# Patient Record
Sex: FEMALE | Race: WHITE
[De-identification: names, ages, dates, MRNs, and addresses within clinical notes are randomized per-mention and may not be internally consistent; named-entity substitution may affect disease eponyms.]

---

## 2018-07-10 ENCOUNTER — HOSPITAL ENCOUNTER (OUTPATIENT)
Dept: HOSPITAL 75 - RAD | Age: 38
End: 2018-07-10
Attending: OBSTETRICS & GYNECOLOGY
Payer: COMMERCIAL

## 2018-07-10 DIAGNOSIS — Z12.31: Primary | ICD-10-CM

## 2018-07-10 PROCEDURE — 77067 SCR MAMMO BI INCL CAD: CPT

## 2018-07-10 NOTE — DIAGNOSTIC IMAGING REPORT
INDICATION: 

Routine screening.



COMPARISON:      

11/14/2013.



TECHNIQUE: 

2D and 3D bilateral screening mammography was performed with CAD.



FINDINGS:

Both breasts are heterogeneously dense, limiting the sensitivity

of mammography. Scattered benign calcifications are noted. No

mass or malignant appearing microcalcifications are seen. The

axillae are unremarkable.



IMPRESSION:

No mammographic features suspicious for malignancy are

identified.



ACR BI-RADS Category 2: Benign findings.

Result letter will be mailed to the patient.

Note: At least 10% of breast cancer is not imaged by mammography.



Dictated by: 



  Dictated on workstation # [id number]

## 2019-01-30 ENCOUNTER — HOSPITAL ENCOUNTER (OUTPATIENT)
Dept: HOSPITAL 75 - RAD | Age: 39
End: 2019-01-30
Attending: FAMILY MEDICINE
Payer: COMMERCIAL

## 2019-01-30 DIAGNOSIS — K76.9: ICD-10-CM

## 2019-01-30 DIAGNOSIS — K80.20: Primary | ICD-10-CM

## 2019-01-30 LAB
ALBUMIN SERPL-MCNC: 4.2 GM/DL (ref 3.2–4.5)
ALP SERPL-CCNC: 62 U/L (ref 40–136)
ALT SERPL-CCNC: 43 U/L (ref 0–55)
BILIRUB SERPL-MCNC: 0.4 MG/DL (ref 0.1–1)
BUN/CREAT SERPL: 24
CALCIUM SERPL-MCNC: 8.7 MG/DL (ref 8.5–10.1)
CHLORIDE SERPL-SCNC: 108 MMOL/L (ref 98–107)
CO2 SERPL-SCNC: 24 MMOL/L (ref 21–32)
CREAT SERPL-MCNC: 0.83 MG/DL (ref 0.6–1.3)
ERYTHROCYTE [DISTWIDTH] IN BLOOD BY AUTOMATED COUNT: 13.1 % (ref 10–14.5)
GFR SERPLBLD BASED ON 1.73 SQ M-ARVRAT: > 60 ML/MIN
GLUCOSE SERPL-MCNC: 86 MG/DL (ref 70–105)
HCT VFR BLD CALC: 43 % (ref 35–52)
HGB BLD-MCNC: 14.2 G/DL (ref 11.5–16)
LIPASE SERPL-CCNC: 22 U/L (ref 8–78)
MCH RBC QN AUTO: 29 PG (ref 25–34)
MCHC RBC AUTO-ENTMCNC: 33 G/DL (ref 32–36)
MCV RBC AUTO: 88 FL (ref 80–99)
PLATELET # BLD: 260 10^3/UL (ref 130–400)
PMV BLD AUTO: 10.6 FL (ref 7.4–10.4)
POTASSIUM SERPL-SCNC: 4.2 MMOL/L (ref 3.6–5)
PROT SERPL-MCNC: 7.3 GM/DL (ref 6.4–8.2)
SODIUM SERPL-SCNC: 140 MMOL/L (ref 135–145)
WBC # BLD AUTO: 4.6 10^3/UL (ref 4.3–11)

## 2019-01-30 PROCEDURE — 83690 ASSAY OF LIPASE: CPT

## 2019-01-30 PROCEDURE — 85027 COMPLETE CBC AUTOMATED: CPT

## 2019-01-30 PROCEDURE — 36415 COLL VENOUS BLD VENIPUNCTURE: CPT

## 2019-01-30 PROCEDURE — 76705 ECHO EXAM OF ABDOMEN: CPT

## 2019-01-30 PROCEDURE — 80053 COMPREHEN METABOLIC PANEL: CPT

## 2019-01-30 NOTE — DIAGNOSTIC IMAGING REPORT
EXAM: RIGHT UPPER QUADRANT ULTRASOUND 



DATE: January 30, 2019.



COMPARISON: CT abdomen and pelvis August 6, 2010. 



INDICATION: 38-year-old female, right upper quadrant abdominal

pain. 



PROCEDURE: 



Two-dimensional grayscale and color doppler ultrasound

examination of the right upper quadrant is performed. 



FINDINGS: 



Liver: The liver is normal in size and contour. The previously

noted enhancing lesion in the right lobe of the liver on prior CT

abdomen is not demonstrated on this exam. The main portal vein

appears patent.



Bile ducts and gallbladder: There are multiple shadowing

gallstones. The gallbladder is not distended. There is no

pericholecystic fluid. There is no abnormal gallbladder wall

thickening. The gallbladder wall measures 0.2 cm.  There is no

intrahepatic or extrahepatic biliary ductal dilation. The common

bile duct measures 0.6 cm. 



Right kidney: Unremarkable right kidney. No hydronephrosis. The

right kidney measures 9.2 cm x 5.0 cm x 4.8 cm. 



Pancreas: Normal visualized pancreas. 



IMPRESSION: 

1. Cholelithiasis without evidence of acute cholecystitis.

2. No biliary ductal dilation.

3. Previously noted 2.0 x 2.0 cm enhancing lesion in the right

lobe of the liver on prior CT abdomen is not demonstrated on

current exam. This lesion is not specific in imaging appearance.

MRI abdomen with and without contrast, liver mass protocol, using

Eovist as the contrast agent is recommended to be performed at

this time for further assessment.

4. Additional right upper quadrant ultrasound evaluation is

unremarkable.



Dictated by: 



  Dictated on workstation # [id number]

## 2019-02-19 ENCOUNTER — HOSPITAL ENCOUNTER (OUTPATIENT)
Dept: HOSPITAL 75 - PREOP | Age: 39
Discharge: HOME | End: 2019-02-19
Attending: SURGERY
Payer: COMMERCIAL

## 2019-02-19 VITALS — WEIGHT: 199 LBS | BODY MASS INDEX: 35.26 KG/M2 | HEIGHT: 63 IN

## 2019-02-19 DIAGNOSIS — Z01.818: Primary | ICD-10-CM

## 2019-02-21 ENCOUNTER — HOSPITAL ENCOUNTER (OUTPATIENT)
Dept: HOSPITAL 75 - SDC | Age: 39
Discharge: HOME | End: 2019-02-21
Attending: SURGERY
Payer: COMMERCIAL

## 2019-02-21 VITALS — SYSTOLIC BLOOD PRESSURE: 121 MMHG | DIASTOLIC BLOOD PRESSURE: 69 MMHG

## 2019-02-21 VITALS — SYSTOLIC BLOOD PRESSURE: 108 MMHG | DIASTOLIC BLOOD PRESSURE: 64 MMHG

## 2019-02-21 VITALS — SYSTOLIC BLOOD PRESSURE: 114 MMHG | DIASTOLIC BLOOD PRESSURE: 66 MMHG

## 2019-02-21 VITALS — WEIGHT: 199 LBS | BODY MASS INDEX: 35.26 KG/M2 | HEIGHT: 63 IN

## 2019-02-21 VITALS — DIASTOLIC BLOOD PRESSURE: 66 MMHG | SYSTOLIC BLOOD PRESSURE: 114 MMHG

## 2019-02-21 VITALS — DIASTOLIC BLOOD PRESSURE: 61 MMHG | SYSTOLIC BLOOD PRESSURE: 124 MMHG

## 2019-02-21 DIAGNOSIS — K80.10: Primary | ICD-10-CM

## 2019-02-21 DIAGNOSIS — Z87.891: ICD-10-CM

## 2019-02-21 LAB
BASOPHILS # BLD AUTO: 0 10^3/UL (ref 0–0.1)
BASOPHILS NFR BLD AUTO: 0 % (ref 0–10)
EOSINOPHIL # BLD AUTO: 0.2 10^3/UL (ref 0–0.3)
EOSINOPHIL NFR BLD AUTO: 3 % (ref 0–10)
ERYTHROCYTE [DISTWIDTH] IN BLOOD BY AUTOMATED COUNT: 13.3 % (ref 10–14.5)
HCT VFR BLD CALC: 39 % (ref 35–52)
HGB BLD-MCNC: 13.1 G/DL (ref 11.5–16)
LYMPHOCYTES # BLD AUTO: 1.8 X 10^3 (ref 1–4)
LYMPHOCYTES NFR BLD AUTO: 35 % (ref 12–44)
MANUAL DIFFERENTIAL PERFORMED BLD QL: NO
MCH RBC QN AUTO: 30 PG (ref 25–34)
MCHC RBC AUTO-ENTMCNC: 34 G/DL (ref 32–36)
MCV RBC AUTO: 88 FL (ref 80–99)
MONOCYTES # BLD AUTO: 0.3 X 10^3 (ref 0–1)
MONOCYTES NFR BLD AUTO: 7 % (ref 0–12)
NEUTROPHILS # BLD AUTO: 2.8 X 10^3 (ref 1.8–7.8)
NEUTROPHILS NFR BLD AUTO: 55 % (ref 42–75)
PLATELET # BLD: 259 10^3/UL (ref 130–400)
PMV BLD AUTO: 10.8 FL (ref 7.4–10.4)
WBC # BLD AUTO: 5.1 10^3/UL (ref 4.3–11)

## 2019-02-21 PROCEDURE — 85025 COMPLETE CBC W/AUTO DIFF WBC: CPT

## 2019-02-21 PROCEDURE — 84703 CHORIONIC GONADOTROPIN ASSAY: CPT

## 2019-02-21 PROCEDURE — 87081 CULTURE SCREEN ONLY: CPT

## 2019-02-21 PROCEDURE — 36415 COLL VENOUS BLD VENIPUNCTURE: CPT

## 2019-02-21 RX ADMIN — SODIUM CHLORIDE, SODIUM LACTATE, POTASSIUM CHLORIDE, AND CALCIUM CHLORIDE PRN MLS/HR: 600; 310; 30; 20 INJECTION, SOLUTION INTRAVENOUS at 11:12

## 2019-02-21 RX ADMIN — SODIUM CHLORIDE, SODIUM LACTATE, POTASSIUM CHLORIDE, AND CALCIUM CHLORIDE PRN MLS/HR: 600; 310; 30; 20 INJECTION, SOLUTION INTRAVENOUS at 13:10

## 2019-02-21 NOTE — DISCHARGE INST-SURGICAL
D/C Lap Instructions-CARLA


New, Converted, or Re-Newed RX:  RX on Chart


Follow Up Appt in 2 weeks





Activity as tolerated


No driving for 24 hours


No driving while on pain medications





Incentive Spirometry use every 2 hours while awake





Regular Diet





Symptoms to Report: Fever over 101 degree F, Nausea/Vomiting 


Infection Signs and Symptoms to report:  Increased redness, Foul odor of wound, 

Increased drainage





Bathing instructions: May shower


Operative Area Clean/Dry;  Keep incision clean/dry





If any problems/questions: Contact your physician or go to Emergency Room











MELISSA AGUIRRE MD Feb 21, 2019 11:53

## 2019-02-21 NOTE — OPERATIVE REPORT
DATE OF SERVICE:  02/21/2019



ATTENDING PRIMARY CARE PHYSICIAN:

Dr. Woodson.



PREOPERATIVE DIAGNOSIS:

Symptomatic chronic calculous cholecystitis.



POSTOPERATIVE DIAGNOSIS:

Symptomatic chronic calculous cholecystitis.



PROCEDURE:

Laparoscopic cholecystectomy.



SURGEON:

Melissa Aguirre MD



ASSISTANT:

GLEN Jaimes.



ANESTHESIA:

General endotracheal.



ESTIMATED BLOOD LOSS:

Minimal.



FINDINGS:

Dilated gallbladder with multiple gallstones.



DISPOSITION:

The patient tolerated the procedure well.



INDICATIONS:

The patient is a 38-year-old female who has had pain in the right upper

abdominal quadrant for the past year on an intermittent basis usually after

meal.  She also reports radiation of pain towards the right shoulder and mid

back.  She also had associated symptoms including abdominal bloating, nausea and

vomiting as well as occasional episodes of diarrhea.  She underwent an

ultrasound, which did show gallstones.



DESCRIPTION OF PROCEDURE:

The patient was brought to the operating room, laid supine on the table.  After

adequate IV pain and sedative medications and general endotracheal intubation,

the abdomen was prepped and draped in standard surgical fashion.



A 0.5% Marcaine with epinephrine was then used to anesthetize the overlying skin

in the left upper abdominal quadrant.  A small transverse skin incision made

using 15 blade.  An 0 silk suture was applied to the medial aspect of the

incision for traction and a Veress needle inserted with a low opening pressure

of 0 mmHg and the abdomen was insufflated to 15 mmHg pressure.  The Veress

needle removed and a 5 mm Xcel trocar placed followed by a 5 mm 45-degree angle

laparoscope visualizing the peritoneal cavity.



A 4-quadrant abdominal exploration was performed.  A slightly dilated

gallbladder was identified.  What was visualized of the stomach, omentum, small

bowel, liver appeared normal.  Under direct visualization, we then proceed to

place a 10 mm supraumbilical port after the skin and peritoneal lining were

anesthetized using 0.5% Marcaine with epinephrine and a transverse skin incision

made using a 15 blade.  In a similar manner, a right upper abdominal quadrant 5

mm port was placed.



The patient was then placed in a reverse Trendelenburg position as well as

placed right side up, left side down.  The fundus of the gallbladder was then

retracted anteriorly and superiorly.  The hepatoduodenal ligament was then

opened using blunt dissection as well as electrocautery on the hook instrument

with visualization of good hemostasis.  The entire critical view of safety was

identified including the triangle of Calot as well as the cystic duct and artery

as the only two structures going into the gallbladder as well as the cystic

plate behind the proximal gallbladder.  A timeout was then taken and the cystic

duct and artery were clipped proximally, distally and cut with EndoShears.  The

gallbladder was then dissected off the liver bed using electrocautery and hook

instrument with visualization of good hemostasis as well as no leaking ducts of

Luschka.  The gallbladder was removed through the 10 mm port site using an

EndoCatch bag.



The 10 mm port fascia and peritoneum were then closed under direct visualization

using a Patricio-Cristela device and 0 Vicryl suture.  The abdomen was then

desufflated.  The remaining ports removed.  All skin incisions were closed using

4-0 Monocryl running subcuticular sutures.  Wounds were then cleaned and covered

with Dermabond.



The patient tolerated the procedure well.  We will start IV and oral pain

medication as well as a clear liquid diet.  Once she is tolerating clears, has

good pain control with oral pain medications, ambulating, we will discharge her

home.  She will be instructed to do no heavy lifting or exertion for the next

two weeks.





Job ID: [id number]

DocumentID: [id number]

Dictated Date:  02/21/2019 14:05:00

Transcription Date: 02/21/2019 20:54:28

Dictated By: MELISSA AGUIRRE MD

## 2019-02-21 NOTE — PROGRESS NOTE-POST OPERATIVE
Post-Operative Progess Note


Surgeon (s)/Assistant (s)


Surgeon


MELISSA AGUIRRE MD


Assistant:  james dunbar APRN





Pre-Operative Diagnosis


chronic calculous cholecystitis





Post-Operative Diagnosis





same





Procedure & Operative Findings


Date of Procedure


2/21/19


Procedure Performed/Findings


laparoscopic cholecystectomy


Anesthesia Type


GET





Estimated Blood Loss


Estimated blood loss (mL):  minimal





Specimens/Packing


Specimens Removed


gallbladder











MELISSA AGUIRRE MD Feb 21, 2019 14:00

## 2019-02-21 NOTE — XMS REPORT
Continuity of Care Document

 Created on: 2019



HECTOR LLAMAS

External Reference #: [id number]

: 1980

Sex: Female



Demographics







 Address  [address] [place]  [address]

 

 Home Phone  [phone] x

 

 Preferred Language  Unknown

 

 Marital Status  Unknown

 

 Nondenominational Affiliation  Unknown

 

 Race  Unknown

 

 Ethnic Group  Unknown





Author







 Author  [facility]

 

 Organization  [facility]

 

 Address  Unknown

 

 Phone  Unavailable



              



Allergies

      





 Active            Description            Code            Type            
Severity            Reaction            Onset            Reported/Identified   
         Relationship to Patient            Clinical Status        

 

 Yes            codeine            [id number]            Drug Allergy          
  Unknown            HIVES                         2015                  
                

 

 Yes            codeine            [id number]            Drug Allergy          
  Mild            HIVES                         2019                     
             



                    



Medications

      



There is no data.                  



Problems

      





 Date Dx Coded            Attending            Type            Code            
Diagnosis            Diagnosed By        

 

 10/07/2015            MANOLO MANN, ANTHONY BURDICK            Ot            
654.23                                  

 

 10/07/2015            MANOLO MANN, ANTHONY BURDICK            Ot            
V72.84                                  

 

 10/07/2015            MANOLO MANN, ANTHONY BURDICK            Ot            V74.8
                                  

 

 10/07/2015            MANOLO MANN, ANTHONY BURDICK            Ot            
O34.21                                  

 

 10/07/2015            MANOLO MANN, ANTHONY BURDICK            Ot            Z37.0
                                  

 

 10/07/2015            MANOLO MANN, ANTHONY BURDICK            Ot            
Z3A.39                                  

 

 2018            MANOLO MANN, ANTHONY BURDICK            Ot            
Z12.31            ENCNTR SCREEN MAMMOGRAM FOR MALIGNANT NE                     

 

 2018            ANTHONY FRENCH MD            Ot            
Z12.31            ENCNTR SCREEN MAMMOGRAM FOR MALIGNANT NE                     

 

 2019            KAREN HERNANDEZ MD            Ot            K76.9       
     LIVER DISEASE, UNSPECIFIED                     

 

 2019            KAREN HERNANDEZ MD            Ot            K80.20      
      CALCULUS OF GALLBLADDER W/O CHOLECYSTITI                     

 

 2019            KAREN HERNANDEZ MD, Ot            K76.9       
     LIVER DISEASE, UNSPECIFIED                     

 

 2019            KAREN HERNANDEZ MD, Ot            K80.20      
      CALCULUS OF GALLBLADDER W/O CHOLECYSTITI                     

 

 2019            KAREN HERNANDEZ MD, Ot            K76.9       
     LIVER DISEASE, UNSPECIFIED                     

 

 2019            KAREN HERNANDEZ MD, Ot            K80.20      
      CALCULUS OF GALLBLADDER W/O CHOLECYSTITI                     



                                            



Procedures

      



There is no data.                  



Results

      





 Test            Result            Range        









 Automated blood complete blood count (hemogram) panel - 19 08:41         









 Blood leukocytes automated count (number/volume)            4.6 10*3/uL       
     4.3-11.0        

 

 Blood erythrocytes automated count (number/volume)            4.88 10*6/uL    
        4.35-5.85        

 

 Venous blood hemoglobin measurement (mass/volume)            14.2 g/dL        
    11.5-16.0        

 

 Blood hematocrit (volume fraction)            43 %            35-52        

 

 Automated erythrocyte mean corpuscular volume            88 [foz_us]          
  80-99        

 

 Automated erythrocyte mean corpuscular hemoglobin (mass per erythrocyte)      
      29 pg            25-34        

 

 Automated erythrocyte mean corpuscular hemoglobin concentration measurement (
mass/volume)            33 g/dL            32-36        

 

 Automated erythrocyte distribution width ratio            13.1 %            
10.0-14.5        

 

 Automated blood platelet count (count/volume)            260 10*3/uL          
  130-400        

 

 Automated blood platelet mean volume measurement            10.6 [foz_us]     
       7.4-10.4        









 Comprehensive metabolic panel - 19 08:41         









 Serum or plasma sodium measurement (moles/volume)            140 mmol/L       
     135-145        

 

 Serum or plasma potassium measurement (moles/volume)            4.2 mmol/L    
        3.6-5.0        

 

 Serum or plasma chloride measurement (moles/volume)            108 mmol/L     
               

 

 Carbon dioxide            24 mmol/L            21-32        

 

 Serum or plasma anion gap determination (moles/volume)            8 mmol/L    
        5-14        

 

 Serum or plasma urea nitrogen measurement (mass/volume)            20 mg/dL   
         7-18        

 

 Serum or plasma creatinine measurement (mass/volume)            0.83 mg/dL    
        0.60-1.30        

 

 Serum or plasma urea nitrogen/creatinine mass ratio            24             
NRG        

 

 Serum or plasma creatinine measurement with calculation of estimated 
glomerular filtration rate            >             NRG        

 

 Serum or plasma glucose measurement (mass/volume)            86 mg/dL         
           

 

 Serum or plasma calcium measurement (mass/volume)            8.7 mg/dL        
    8.5-10.1        

 

 Serum or plasma total bilirubin measurement (mass/volume)            0.4 mg/dL
            0.1-1.0        

 

 Serum or plasma alkaline phosphatase measurement (enzymatic activity/volume)  
          62 U/L                    

 

 Serum or plasma aspartate aminotransferase measurement (enzymatic activity/
volume)            23 U/L            5-34        

 

 Serum or plasma alanine aminotransferase measurement (enzymatic activity/volume
)            43 U/L            0-55        

 

 Serum or plasma protein measurement (mass/volume)            7.3 g/dL         
   6.4-8.2        

 

 Serum or plasma albumin measurement (mass/volume)            4.2 g/dL         
   3.2-4.5        

 

 CALCIUM CORRECTED            8.5 mg/dL            8.5-10.1        









 Lipase - 19 08:41         









 Lipase            22 U/L            8-78        



                    



Encounters

      





 ACCT No.            Visit Date/Time            Discharge            Status    
        Pt. Type            Provider            Facility            Loc./Unit  
          Complaint        

 

 [id number]            2019 14:02:00            2019 14:27:00    
        DIS            Outpatient            MELISSA AGUIRRE MD            [facility]            PREOP            LAP ELIE        

 

 [id number]            2019 08:34:00            2019 23:59:59    
        CLS            Outpatient            KAREN HERNANDEZ MD            [facility]            RAD            RUQ PAIN        

 

 [id number]            07/10/2018 09:24:00            07/10/2018 23:59:59    
        CLS            Outpatient            ANTHONY FRENCH MD         
   [facility]            RAD            ROUTINE        

 

 [id number]            10/05/2015 06:02:00            10/07/2015 17:25:00    
        DIS            Inpatient            ANTHONY FRENCH MD          
  [facility]            LDRP                     

 

 [id number]            2015 10:35:00            2015 23:59:59    
        CLS            Outpatient            ANTHONY FRENCH MD         
   [facility]            PREOP                     

 

 [id number]            2013 13:38:00            2013 23:59:59    
        CLS            Outpatient                                              
              

 

 [id number]            2013 08:46:00            2013 23:59:59    
        CLS            Outpatient                                              
              

 

 [id number]            2019 08:30:00                         PEN       
     Preadmit            MELISSA AGUIRRE MD            [facility]            SDC            [facility]        

 

 [id number]            2015 10:35:51                         ACT            
Document Registration                                                          
  

 

 [id number]            10/12/2017 14:35:13            10/12/2017 23:59:59            
CLS            Outpatient

## 2019-02-21 NOTE — PROGRESS NOTE-PRE OPERATIVE
Pre-Operative Progress Note


H&P Reviewed


The H&P was reviewed, patient examined and no changes noted.


Date Seen by Provider:  Feb 21, 2019


Time Seen by Provider:  11:45


Date H&P Reviewed:  Feb 21, 2019


Time H&P Reviewed:  11:45


Pre-Operative Diagnosis:  chronic calculous cholecystitis











MELISSA AGUIRRE MD Feb 21, 2019 11:51

## 2019-02-21 NOTE — ANESTHESIA-GENERAL POST-OP
General


Patient Condition


Mental Status/LOC:  Same as Preop


Cardiovascular:  Satisfactory


Nausea/Vomiting:  Absent


Respiratory:  Satisfactory


Pain:  Controlled


Complications:  Absent





Post Op Complications


Complications


None





Follow Up Care/Instructions


Patient Instructions


None needed.





Anesthesia/Patient Condition


Patient Condition


Patient is doing well, no complaints, stable vital signs, no apparent adverse 

anesthesia problems.











JOSSE MAN DO Feb 21, 2019 16:13

## 2020-06-02 ENCOUNTER — HOSPITAL ENCOUNTER (OUTPATIENT)
Dept: HOSPITAL 75 - RAD | Age: 40
End: 2020-06-02
Attending: OBSTETRICS & GYNECOLOGY
Payer: COMMERCIAL

## 2020-06-02 DIAGNOSIS — Z12.31: Primary | ICD-10-CM

## 2020-06-02 PROCEDURE — 77067 SCR MAMMO BI INCL CAD: CPT

## 2020-06-02 PROCEDURE — 77063 BREAST TOMOSYNTHESIS BI: CPT

## 2020-06-02 NOTE — DIAGNOSTIC IMAGING REPORT
EXAM: Digital mammogram bilateral screening



COMPARISONS: 7/10/2018 and 11/11/2013. 



There are no current complaints. 



The current study was also evaluated with a Computer Aided

Detection (CAD) system.



FINDINGS: The fibroglandular tissue in both breasts is

heterogeneously dense. This does limit the sensitivity of this

exam. Overall, there does not appear to have been any significant

change when compared to the prior study. No primary or secondary

sign of malignancy is noted.



IMPRESSION: There is no radiographic evidence for malignancy.



ACR BI-RADS Category 1: Negative.

Result letter will be mailed to the patient.

Note:  At least 10% of breast cancer is not imaged by

mammography.



Dictated by: 



  Dictated on workstation # [id number]

## 2022-05-16 ENCOUNTER — HOSPITAL ENCOUNTER (OUTPATIENT)
Dept: HOSPITAL 75 - RAD | Age: 42
End: 2022-05-16
Attending: OBSTETRICS & GYNECOLOGY
Payer: COMMERCIAL

## 2022-05-16 DIAGNOSIS — Z12.31: Primary | ICD-10-CM

## 2022-05-16 PROCEDURE — 77063 BREAST TOMOSYNTHESIS BI: CPT

## 2022-05-16 PROCEDURE — 77067 SCR MAMMO BI INCL CAD: CPT

## 2022-05-16 NOTE — DIAGNOSTIC IMAGING REPORT
INDICATION: 

Routine screening.



COMPARISON:      

06/02/2020 and 07/10/2018.



TECHNIQUE: 

2D and 3D bilateral screening mammography was performed with CAD.



FINDINGS:

Both breasts are heterogeneously dense, limiting the sensitivity

of mammography. There are occasional benign calcifications in

both breasts. No mass or malignant-appearing microcalcifications

are seen. The axillae are unremarkable.



IMPRESSION: 

No mammographic features suspicious for malignancy are

identified.



ACR BI-RADS Category 2: Benign findings.

Result letter will be mailed to the patient.

Note: At least 10% of breast cancer is not imaged by mammography.



Dictated by: 



  Dictated on workstation # [id number]

## 2023-10-31 ENCOUNTER — HOSPITAL ENCOUNTER (OUTPATIENT)
Dept: HOSPITAL 75 - RAD | Age: 43
End: 2023-10-31
Attending: OBSTETRICS & GYNECOLOGY
Payer: COMMERCIAL

## 2023-10-31 DIAGNOSIS — Z12.31: Primary | ICD-10-CM

## 2023-10-31 PROCEDURE — 77063 BREAST TOMOSYNTHESIS BI: CPT

## 2023-10-31 PROCEDURE — 77067 SCR MAMMO BI INCL CAD: CPT

## 2023-10-31 NOTE — DIAGNOSTIC IMAGING REPORT
INDICATION: 

Routine screening.



COMPARISON: 

05/16/2022 and 06/02/2020.



TECHNIQUE: 

2D and 3D bilateral screening mammography was performed with CAD.



FINDINGS:

Both breasts are heterogeneously dense, limiting the sensitivity

of mammography. The parenchymal pattern is stable. No mass or

malignant-appearing microcalcifications are identified. There are

benign calcifications bilaterally. The axillae are unremarkable.



IMPRESSION: 

No mammographic features suspicious for malignancy are

identified.



ACR BI-RADS Category 2: Benign findings.

Result letter will be mailed to the patient.

Note: At least 10% of breast cancer is not imaged by mammography.



Dictated by: 



  Dictated on workstation # [id number]